# Patient Record
Sex: FEMALE | URBAN - METROPOLITAN AREA
[De-identification: names, ages, dates, MRNs, and addresses within clinical notes are randomized per-mention and may not be internally consistent; named-entity substitution may affect disease eponyms.]

---

## 2021-10-05 ENCOUNTER — APPOINTMENT (RX ONLY)
Dept: URBAN - METROPOLITAN AREA CLINIC 151 | Facility: CLINIC | Age: 41
Setting detail: DERMATOLOGY
End: 2021-10-05

## 2021-10-05 DIAGNOSIS — L81.8 OTHER SPECIFIED DISORDERS OF PIGMENTATION: ICD-10-CM

## 2021-10-05 DIAGNOSIS — D22 MELANOCYTIC NEVI: ICD-10-CM

## 2021-10-05 DIAGNOSIS — L81.4 OTHER MELANIN HYPERPIGMENTATION: ICD-10-CM

## 2021-10-05 DIAGNOSIS — L20.89 OTHER ATOPIC DERMATITIS: ICD-10-CM

## 2021-10-05 PROBLEM — D22.5 MELANOCYTIC NEVI OF TRUNK: Status: ACTIVE | Noted: 2021-10-05

## 2021-10-05 PROBLEM — L20.84 INTRINSIC (ALLERGIC) ECZEMA: Status: ACTIVE | Noted: 2021-10-05

## 2021-10-05 PROCEDURE — ? PRESCRIPTION MEDICATION MANAGEMENT

## 2021-10-05 PROCEDURE — 99204 OFFICE O/P NEW MOD 45 MIN: CPT

## 2021-10-05 PROCEDURE — ? COUNSELING

## 2021-10-05 PROCEDURE — ? DIAGNOSIS COMMENT

## 2021-10-05 PROCEDURE — ? OBSERVATION AND MEASURE

## 2021-10-05 ASSESSMENT — LOCATION DETAILED DESCRIPTION DERM
LOCATION DETAILED: SUPERIOR THORACIC SPINE
LOCATION DETAILED: RIGHT PROXIMAL POSTERIOR UPPER ARM
LOCATION DETAILED: LEFT PROXIMAL POSTERIOR UPPER ARM
LOCATION DETAILED: INFERIOR THORACIC SPINE
LOCATION DETAILED: LEFT MEDIAL SUPERIOR CHEST

## 2021-10-05 ASSESSMENT — LOCATION ZONE DERM
LOCATION ZONE: ARM
LOCATION ZONE: TRUNK

## 2021-10-05 ASSESSMENT — LOCATION SIMPLE DESCRIPTION DERM
LOCATION SIMPLE: CHEST
LOCATION SIMPLE: RIGHT POSTERIOR UPPER ARM
LOCATION SIMPLE: UPPER BACK
LOCATION SIMPLE: LEFT POSTERIOR UPPER ARM

## 2021-10-05 NOTE — PROCEDURE: OBSERVATION
Body Location Override (Optional - Billing Will Still Be Based On Selected Body Map Location If Applicable): shoulders, back
Detail Level: Detailed
Size Of Lesion: 2-4mm
Morphology Per Location (Optional): brown macules
Body Location Override (Optional - Billing Will Still Be Based On Selected Body Map Location If Applicable): left medial chest
Size Of Lesion: 5mm
Morphology Per Location (Optional): Pink papule

## 2021-10-05 NOTE — PROCEDURE: DIAGNOSIS COMMENT
Comment: Mild, patient notes exacerbated during pregnancy. Advised to try OTC hydrocortisone but may call office for stronger topical steroid
Render Risk Assessment In Note?: no
Detail Level: Zone
Comment: s/p pregnancy

## 2021-10-05 NOTE — PROCEDURE: MIPS QUALITY
Quality 226: Preventive Care And Screening: Tobacco Use: Screening And Cessation Intervention: Patient screened for tobacco use and is an ex/non-smoker
Quality 402: Tobacco Use And Help With Quitting Among Adolescents: Patient screened for tobacco and never smoked
Quality 130: Documentation Of Current Medications In The Medical Record: Current Medications Documented
Detail Level: Detailed
Quality 431: Preventive Care And Screening: Unhealthy Alcohol Use - Screening: Patient not identified as an unhealthy alcohol user when screened for unhealthy alcohol use using a systematic screening method
Quality 110: Preventive Care And Screening: Influenza Immunization: Influenza Immunization previously received during influenza season

## 2021-10-05 NOTE — PROCEDURE: PRESCRIPTION MEDICATION MANAGEMENT
Detail Level: Zone
Render In Strict Bullet Format?: No
Initiate Treatment: OTC hydrocortisone cream BID for upwards of 2 weeks
Plan: If unimproved after trying OTC hydrocortisone, patient to contact office for stronger topical steroid

## 2022-08-09 ENCOUNTER — APPOINTMENT (RX ONLY)
Dept: URBAN - METROPOLITAN AREA CLINIC 151 | Facility: CLINIC | Age: 42
Setting detail: DERMATOLOGY
End: 2022-08-09

## 2022-08-09 DIAGNOSIS — L30.9 DERMATITIS, UNSPECIFIED: ICD-10-CM

## 2022-08-09 DIAGNOSIS — L20.89 OTHER ATOPIC DERMATITIS: ICD-10-CM

## 2022-08-09 DIAGNOSIS — D22 MELANOCYTIC NEVI: ICD-10-CM

## 2022-08-09 DIAGNOSIS — L81.4 OTHER MELANIN HYPERPIGMENTATION: ICD-10-CM

## 2022-08-09 DIAGNOSIS — D18.0 HEMANGIOMA: ICD-10-CM

## 2022-08-09 PROBLEM — D18.01 HEMANGIOMA OF SKIN AND SUBCUTANEOUS TISSUE: Status: ACTIVE | Noted: 2022-08-09

## 2022-08-09 PROBLEM — D22.5 MELANOCYTIC NEVI OF TRUNK: Status: ACTIVE | Noted: 2022-08-09

## 2022-08-09 PROBLEM — L20.84 INTRINSIC (ALLERGIC) ECZEMA: Status: ACTIVE | Noted: 2022-08-09

## 2022-08-09 PROCEDURE — ? PRESCRIPTION MEDICATION MANAGEMENT

## 2022-08-09 PROCEDURE — 99214 OFFICE O/P EST MOD 30 MIN: CPT

## 2022-08-09 PROCEDURE — ? PRESCRIPTION

## 2022-08-09 PROCEDURE — ? COUNSELING

## 2022-08-09 RX ORDER — TRIAMCINOLONE ACETONIDE 1 MG/G
CREAM TOPICAL BID
Qty: 15 | Refills: 6 | Status: ERX | COMMUNITY
Start: 2022-08-09

## 2022-08-09 RX ADMIN — TRIAMCINOLONE ACETONIDE: 1 CREAM TOPICAL at 00:00

## 2022-08-09 ASSESSMENT — LOCATION ZONE DERM
LOCATION ZONE: TRUNK
LOCATION ZONE: ARM

## 2022-08-09 ASSESSMENT — LOCATION SIMPLE DESCRIPTION DERM
LOCATION SIMPLE: LEFT UPPER ARM
LOCATION SIMPLE: CHEST

## 2022-08-09 ASSESSMENT — LOCATION DETAILED DESCRIPTION DERM
LOCATION DETAILED: LEFT ANTERIOR PROXIMAL UPPER ARM
LOCATION DETAILED: LEFT MEDIAL SUPERIOR CHEST

## 2022-08-09 NOTE — HPI: RASH (ECZEMA)
Is This A New Presentation, Or A Follow-Up?: Follow Up Eczema
Additional History: Patient states she used her kids prescription hydrocortisone for a while and that did help, when she ran out of the prescription strength hydrocortisone she used the OTC one.

## 2022-08-09 NOTE — PROCEDURE: COUNSELING
Sunscreen Recommendations: CHEMICAL BLOCKERS\\n\\nEucerin Daily Protection Face Lotion \\nNeutrogena Invisible Daily Defense Serum \\nCeraVe Ultra-Light Moisturizing Lotion \\nNeutrogena Hydroboost \\nCetaphil Face Moisturizer \\nLa Roche Posay Toleriane Double Repair Facial Moisturizer \\nElta MD UV Clear \\nLa Roche Posay Antihelios Melt-In Milk Sunscreen \\n\\nPHYSICAL BLOCKERS\\n\\nAveeno Positively Mineral Sunscreen \\nCetaphil Sheer Mineral Face \\nCeraVe Hydrating Mineral Sunscreen (sheer tint) \\nLa Roche Posay Antihelios Mineral Ultra LIght Fluid Face Sunscreen  \\nElta MD UV Daily \\nAvene Solaire UV MIneral Multi-Defense Sunscreen\\nSupergoop CC Screen (multiple shades)\\nTizo AM Replenish (tinted/non-tinted)
Detail Level: Generalized
Detail Level: Detailed
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
Patient Specific Counseling (Will Not Stick From Patient To Patient): -Patient states she has an eczematous patch on her arm that is different from the rash she currently has all over her body which started last week\\n-Recommend triamcinolone all over BID 2-4 weeks then as needed for flares

## 2023-01-03 ENCOUNTER — APPOINTMENT (RX ONLY)
Dept: URBAN - METROPOLITAN AREA CLINIC 152 | Facility: CLINIC | Age: 43
Setting detail: DERMATOLOGY
End: 2023-01-03

## 2023-01-03 DIAGNOSIS — L20.89 OTHER ATOPIC DERMATITIS: ICD-10-CM

## 2023-01-03 PROBLEM — L20.84 INTRINSIC (ALLERGIC) ECZEMA: Status: ACTIVE | Noted: 2023-01-03

## 2023-01-03 PROCEDURE — ? PRESCRIPTION

## 2023-01-03 PROCEDURE — ? DIAGNOSIS COMMENT

## 2023-01-03 PROCEDURE — 99214 OFFICE O/P EST MOD 30 MIN: CPT

## 2023-01-03 PROCEDURE — ? COUNSELING

## 2023-01-03 PROCEDURE — ? PRESCRIPTION MEDICATION MANAGEMENT

## 2023-01-03 RX ORDER — TRIAMCINOLONE ACETONIDE 1 MG/G
CREAM TOPICAL BID
Qty: 15 | Refills: 6 | Status: ERX

## 2023-01-03 RX ORDER — TACROLIMUS 1 MG/G
OINTMENT TOPICAL
Qty: 30 | Refills: 6 | Status: ERX | COMMUNITY
Start: 2023-01-03

## 2023-01-03 RX ADMIN — TACROLIMUS: 1 OINTMENT TOPICAL at 00:00

## 2023-01-03 ASSESSMENT — LOCATION DETAILED DESCRIPTION DERM
LOCATION DETAILED: LEFT ANTERIOR PROXIMAL UPPER ARM
LOCATION DETAILED: RIGHT ANTERIOR LATERAL PROXIMAL UPPER ARM

## 2023-01-03 ASSESSMENT — LOCATION ZONE DERM: LOCATION ZONE: ARM

## 2023-01-03 ASSESSMENT — LOCATION SIMPLE DESCRIPTION DERM
LOCATION SIMPLE: LEFT UPPER ARM
LOCATION SIMPLE: RIGHT UPPER ARM

## 2023-01-03 NOTE — PROCEDURE: DIAGNOSIS COMMENT
Comment: Discussed this is chronic condition, discussed using the triamcinolone only with flares, also discussed adding tacrolimus 1-2 daily then weaning down to a couple times a week for maintenance. If tacrolimus doesn’t control symptoms, consider opzelura.
Detail Level: Simple
Render Risk Assessment In Note?: no

## 2023-03-30 ENCOUNTER — APPOINTMENT (RX ONLY)
Dept: URBAN - METROPOLITAN AREA CLINIC 152 | Facility: CLINIC | Age: 43
Setting detail: DERMATOLOGY
End: 2023-03-30

## 2023-03-30 DIAGNOSIS — L20.89 OTHER ATOPIC DERMATITIS: ICD-10-CM

## 2023-03-30 PROBLEM — L20.84 INTRINSIC (ALLERGIC) ECZEMA: Status: ACTIVE | Noted: 2023-03-30

## 2023-03-30 PROCEDURE — ? COUNSELING

## 2023-03-30 PROCEDURE — 99214 OFFICE O/P EST MOD 30 MIN: CPT

## 2023-03-30 PROCEDURE — ? DIAGNOSIS COMMENT

## 2023-03-30 PROCEDURE — ? PRESCRIPTION MEDICATION MANAGEMENT

## 2023-03-30 ASSESSMENT — LOCATION SIMPLE DESCRIPTION DERM
LOCATION SIMPLE: LEFT UPPER ARM
LOCATION SIMPLE: RIGHT UPPER ARM

## 2023-03-30 ASSESSMENT — LOCATION DETAILED DESCRIPTION DERM
LOCATION DETAILED: RIGHT ANTERIOR LATERAL PROXIMAL UPPER ARM
LOCATION DETAILED: LEFT ANTERIOR PROXIMAL UPPER ARM

## 2023-03-30 ASSESSMENT — LOCATION ZONE DERM: LOCATION ZONE: ARM

## 2023-03-30 NOTE — PROCEDURE: PRESCRIPTION MEDICATION MANAGEMENT
Render In Strict Bullet Format?: No
Detail Level: Zone
Discontinue Regimen: tacrolimus 0.1 % topical ointment BID
Continue Regimen: triamcinolone acetonide 0.1 % topical cream BID for 1-2 weeks for flares\\nMoisturize BID

## 2023-03-30 NOTE — PROCEDURE: DIAGNOSIS COMMENT
Comment: Discussed this is chronic condition, discussed with patient the possibility of starting Dupixent in the future if not seeing the results she wants or current regimen becomes to cumbersome, but agreed that she is not at that stage yet.  Slight progression; previously only arms, now arms and legs. She expressed frustration that this is something she now has to chronically deal with. Counseled pt that the triamcinolone is for more severe spots 1-2 weeks at a time; recommend treating for 2 days past clear. If she doesn?t feel like tacrolimus maintenance is helping she can stop. She does feel she is getting it at the same frequency as before she started the tacrolimus.
Detail Level: Simple
Render Risk Assessment In Note?: no

## 2024-10-31 ENCOUNTER — APPOINTMENT (RX ONLY)
Dept: URBAN - METROPOLITAN AREA CLINIC 152 | Facility: CLINIC | Age: 44
Setting detail: DERMATOLOGY
End: 2024-10-31

## 2024-10-31 DIAGNOSIS — L71.8 OTHER ROSACEA: ICD-10-CM

## 2024-10-31 DIAGNOSIS — D22 MELANOCYTIC NEVI: ICD-10-CM

## 2024-10-31 DIAGNOSIS — L20.89 OTHER ATOPIC DERMATITIS: ICD-10-CM

## 2024-10-31 DIAGNOSIS — D18.0 HEMANGIOMA: ICD-10-CM

## 2024-10-31 DIAGNOSIS — L81.4 OTHER MELANIN HYPERPIGMENTATION: ICD-10-CM

## 2024-10-31 PROBLEM — D22.5 MELANOCYTIC NEVI OF TRUNK: Status: ACTIVE | Noted: 2024-10-31

## 2024-10-31 PROBLEM — D18.01 HEMANGIOMA OF SKIN AND SUBCUTANEOUS TISSUE: Status: ACTIVE | Noted: 2024-10-31

## 2024-10-31 PROCEDURE — ? COUNSELING

## 2024-10-31 PROCEDURE — ? PRESCRIPTION

## 2024-10-31 PROCEDURE — ? PRESCRIPTION MEDICATION MANAGEMENT

## 2024-10-31 PROCEDURE — 99214 OFFICE O/P EST MOD 30 MIN: CPT

## 2024-10-31 PROCEDURE — ? DIAGNOSIS COMMENT

## 2024-10-31 RX ORDER — TRIAMCINOLONE ACETONIDE 1 MG/G
CREAM TOPICAL BID
Qty: 15 | Refills: 6 | Status: ERX

## 2024-10-31 RX ORDER — METRONIDAZOLE 7.5 MG/G
GEL TOPICAL
Qty: 45 | Refills: 4 | Status: ERX | COMMUNITY
Start: 2024-10-31

## 2024-10-31 RX ADMIN — METRONIDAZOLE: 7.5 GEL TOPICAL at 00:00

## 2024-10-31 ASSESSMENT — LOCATION ZONE DERM
LOCATION ZONE: TRUNK
LOCATION ZONE: ARM

## 2024-10-31 ASSESSMENT — LOCATION SIMPLE DESCRIPTION DERM
LOCATION SIMPLE: ABDOMEN
LOCATION SIMPLE: LEFT UPPER ARM
LOCATION SIMPLE: CHEST

## 2024-10-31 ASSESSMENT — LOCATION DETAILED DESCRIPTION DERM
LOCATION DETAILED: LEFT ANTERIOR PROXIMAL UPPER ARM
LOCATION DETAILED: LEFT MEDIAL SUPERIOR CHEST
LOCATION DETAILED: LEFT LATERAL ABDOMEN

## 2024-10-31 NOTE — PROCEDURE: DIAGNOSIS COMMENT
Comment: Discussed nature & etiology; ERT more difficult to treat. counseled on starting with metronidazole gel then moving up to azaleic acid & finally rhofade which is not usually covered by insurance & would send out to a compounding pharmacy. As a final treatment course counseled on laser therapy which would be out of pocket
Detail Level: Simple
Render Risk Assessment In Note?: no
Comment: Pt managing w/ triamcinolone when severely flared. Infrequent flares now. Triggered by shaving and hot water.

## 2024-10-31 NOTE — PROCEDURE: PRESCRIPTION MEDICATION MANAGEMENT
Render In Strict Bullet Format?: No
Detail Level: Zone
Continue Regimen: triamcinolone acetonide 0.1 % topical cream BID.
Initiate Treatment: Metronidazole 0.75% cream BID

## 2024-10-31 NOTE — HPI: OTHER
Condition:: FBSE
Please Describe Your Condition:: presents for a skin check to evaluate moles and lesions on the skin

## 2024-10-31 NOTE — PROCEDURE: COUNSELING
Sunscreen Recommendations: CHEMICAL BLOCKERS\\n\\nEucerin Daily Protection Face Lotion \\nNeutrogena Invisible Daily Defense Serum \\nCeraVe Ultra-Light Moisturizing Lotion \\nNeutrogena Hydroboost \\nCetaphil Face Moisturizer \\nLa Roche Posay Toleriane Double Repair Facial Moisturizer \\nElta MD UV Clear \\nLa Roche Posay Antihelios Melt-In Milk Sunscreen \\n\\nPHYSICAL BLOCKERS\\n\\nAveeno Positively Mineral Sunscreen \\nCetaphil Sheer Mineral Face \\nCeraVe Hydrating Mineral Sunscreen (sheer tint) \\nLa Roche Posay Antihelios Mineral Ultra LIght Fluid Face Sunscreen  \\nElta MD UV Daily \\nAvene Solaire UV MIneral Multi-Defense Sunscreen\\nSupergoop CC Screen (multiple shades)\\nTizo AM Replenish (tinted/non-tinted)
Detail Level: Generalized
Patient Specific Counseling (Will Not Stick From Patient To Patient): -Patient states she has an eczematous patch on her arm that is different from the rash she currently has all over her body which started last week\\n-Recommend triamcinolone all over BID 2-4 weeks then as needed for flares
Detail Level: Detailed

## 2025-03-13 ENCOUNTER — APPOINTMENT (OUTPATIENT)
Dept: URBAN - METROPOLITAN AREA CLINIC 152 | Facility: CLINIC | Age: 45
Setting detail: DERMATOLOGY
End: 2025-03-13

## 2025-03-13 DIAGNOSIS — D49.2 NEOPLASM OF UNSPECIFIED BEHAVIOR OF BONE, SOFT TISSUE, AND SKIN: ICD-10-CM

## 2025-03-13 DIAGNOSIS — L57.0 ACTINIC KERATOSIS: ICD-10-CM

## 2025-03-13 DIAGNOSIS — L71.8 OTHER ROSACEA: ICD-10-CM

## 2025-03-13 DIAGNOSIS — L20.89 OTHER ATOPIC DERMATITIS: ICD-10-CM

## 2025-03-13 PROCEDURE — ? PRESCRIPTION MEDICATION MANAGEMENT

## 2025-03-13 PROCEDURE — 17000 DESTRUCT PREMALG LESION: CPT

## 2025-03-13 PROCEDURE — ? COUNSELING

## 2025-03-13 PROCEDURE — 99214 OFFICE O/P EST MOD 30 MIN: CPT | Mod: 25

## 2025-03-13 PROCEDURE — ? PRESCRIPTION

## 2025-03-13 PROCEDURE — ? OBSERVATION

## 2025-03-13 PROCEDURE — ? DIAGNOSIS COMMENT

## 2025-03-13 PROCEDURE — ? LIQUID NITROGEN

## 2025-03-13 RX ORDER — OXYMETAZOLINE HYDROCHLORIDE 30 G/1
CREAM TOPICAL
Qty: 30 | Refills: 3 | Status: ERX | COMMUNITY
Start: 2025-03-13

## 2025-03-13 RX ORDER — OXYMETAZOLINE HYDROCHLORIDE 30 G/1
CREAM TOPICAL
Qty: 30 | Refills: 3 | Status: ERX

## 2025-03-13 RX ADMIN — OXYMETAZOLINE HYDROCHLORIDE: 30 CREAM TOPICAL at 00:00

## 2025-03-13 ASSESSMENT — LOCATION SIMPLE DESCRIPTION DERM
LOCATION SIMPLE: LEFT UPPER ARM
LOCATION SIMPLE: LEFT NOSE

## 2025-03-13 ASSESSMENT — LOCATION ZONE DERM
LOCATION ZONE: ARM
LOCATION ZONE: NOSE

## 2025-03-13 ASSESSMENT — SEVERITY ASSESSMENT 2020: SEVERITY 2020: CLEAR

## 2025-03-13 ASSESSMENT — ITCH NUMERIC RATING SCALE: HOW SEVERE IS YOUR ITCHING?: 0

## 2025-03-13 ASSESSMENT — BSA RASH: BSA RASH: 0

## 2025-03-13 ASSESSMENT — LOCATION DETAILED DESCRIPTION DERM
LOCATION DETAILED: LEFT ANTERIOR PROXIMAL UPPER ARM
LOCATION DETAILED: LEFT NASAL ALA

## 2025-03-13 NOTE — PROCEDURE: COUNSELING
Detail Level: Detailed
Patient Specific Counseling (Will Not Stick From Patient To Patient): -Patient states she has an eczematous patch on her arm that is different from the rash she currently has all over her body which started last week\\n-Recommend triamcinolone all over BID 2-4 weeks then as needed for flares

## 2025-03-13 NOTE — PROCEDURE: PRESCRIPTION MEDICATION MANAGEMENT
Discontinue Regimen: Metronidazole 0.75% cream BID
Initiate Treatment: Rhofade apply pea sized amount to cheeks and nose daily
Render In Strict Bullet Format?: No
Detail Level: Zone
Continue Regimen: triamcinolone acetonide 0.1 % topical cream BID.

## 2025-03-13 NOTE — PROCEDURE: OBSERVATION
Detail Level: Detailed
Size Of Lesion: 2mm skin colored to yellow papule w/ surrounding telangiectasia

## 2025-03-13 NOTE — PROCEDURE: LIQUID NITROGEN
Show Aperture Variable?: Yes
Consent: The patient's consent was obtained including but not limited to risks of crusting, scabbing, blistering, scarring, darker or lighter pigmentary change, recurrence, incomplete removal and infection.
Render Post-Care Instructions In Note?: no
Number Of Freeze-Thaw Cycles: 2 freeze-thaw cycles
Detail Level: Detailed
Post-Care Instructions: I reviewed with the patient in detail post-care instructions. The spot(s) may becomes red today and scab or even blister. May apply vaseline daily until healed.
Duration Of Freeze Thaw-Cycle (Seconds): 15

## 2025-03-13 NOTE — PROCEDURE: DIAGNOSIS COMMENT
Comment: Disussed trying azaleic acid &/or rhofade which is not usually covered by insurance, would send out to a compounding pharmacy. As a final treatment course counseled on laser therapy which would be out of pocket - discussed procedure and symptoms.
Detail Level: Simple
Render Risk Assessment In Note?: no
Comment: Pt managing w/ triamcinolone when severely flared. Infrequent flares, not using tacrolimus at the moment.

## 2025-03-20 ENCOUNTER — RX ONLY (RX ONLY)
Age: 45
End: 2025-03-20

## 2025-03-20 RX ORDER — OXYMETAZOLINE HYDROCHLORIDE 30 G/1
CREAM TOPICAL
Qty: 30 | Refills: 3 | Status: ERX